# Patient Record
Sex: MALE | URBAN - METROPOLITAN AREA
[De-identification: names, ages, dates, MRNs, and addresses within clinical notes are randomized per-mention and may not be internally consistent; named-entity substitution may affect disease eponyms.]

---

## 2021-11-05 ENCOUNTER — TELEPHONE (OUTPATIENT)
Dept: CARDIOLOGY | Facility: MEDICAL CENTER | Age: 21
End: 2021-11-05

## 2021-11-05 NOTE — TELEPHONE ENCOUNTER
Received [facility] EKG stating to have reinterpretation done using our interpretation form. EKG given to ADD LS. Completed form with supporting documents faxed to [facility] attn to Jazzy.    Fax confirmation received and sent to [facility].